# Patient Record
Sex: MALE | Race: AMERICAN INDIAN OR ALASKA NATIVE | Employment: UNEMPLOYED | URBAN - METROPOLITAN AREA
[De-identification: names, ages, dates, MRNs, and addresses within clinical notes are randomized per-mention and may not be internally consistent; named-entity substitution may affect disease eponyms.]

---

## 2021-08-30 ENCOUNTER — APPOINTMENT (OUTPATIENT)
Dept: GENERAL RADIOLOGY | Facility: CLINIC | Age: 36
End: 2021-08-30
Attending: EMERGENCY MEDICINE
Payer: COMMERCIAL

## 2021-08-30 ENCOUNTER — HOSPITAL ENCOUNTER (EMERGENCY)
Facility: CLINIC | Age: 36
Discharge: HOME | End: 2021-08-31
Attending: EMERGENCY MEDICINE | Admitting: EMERGENCY MEDICINE
Payer: COMMERCIAL

## 2021-08-30 DIAGNOSIS — S50.01XA CONTUSION OF RIGHT ELBOW, INITIAL ENCOUNTER: ICD-10-CM

## 2021-08-30 DIAGNOSIS — S20.211A CONTUSION OF RIGHT CHEST WALL, INITIAL ENCOUNTER: ICD-10-CM

## 2021-08-30 PROCEDURE — 250N000013 HC RX MED GY IP 250 OP 250 PS 637: Performed by: EMERGENCY MEDICINE

## 2021-08-30 PROCEDURE — 99283 EMERGENCY DEPT VISIT LOW MDM: CPT | Mod: 25 | Performed by: EMERGENCY MEDICINE

## 2021-08-30 PROCEDURE — 99284 EMERGENCY DEPT VISIT MOD MDM: CPT | Performed by: EMERGENCY MEDICINE

## 2021-08-30 PROCEDURE — 71046 X-RAY EXAM CHEST 2 VIEWS: CPT

## 2021-08-30 PROCEDURE — 73080 X-RAY EXAM OF ELBOW: CPT | Mod: RT

## 2021-08-30 RX ORDER — IBUPROFEN 800 MG/1
800 TABLET, FILM COATED ORAL ONCE
Status: COMPLETED | OUTPATIENT
Start: 2021-08-30 | End: 2021-08-30

## 2021-08-30 RX ADMIN — IBUPROFEN 800 MG: 800 TABLET, FILM COATED ORAL at 23:11

## 2021-08-30 ASSESSMENT — ENCOUNTER SYMPTOMS
FEVER: 0
VOMITING: 0
DIFFICULTY URINATING: 0
HEADACHES: 0
SLEEP DISTURBANCE: 0
SHORTNESS OF BREATH: 0
EYE REDNESS: 0
ABDOMINAL PAIN: 0
NUMBNESS: 0
SORE THROAT: 0
COUGH: 0
NECK PAIN: 0
BACK PAIN: 0
NAUSEA: 0
WEAKNESS: 0
DYSPHORIC MOOD: 0

## 2021-08-30 ASSESSMENT — MIFFLIN-ST. JEOR: SCORE: 2001.74

## 2021-08-31 VITALS
BODY MASS INDEX: 31.02 KG/M2 | SYSTOLIC BLOOD PRESSURE: 143 MMHG | HEART RATE: 116 BPM | HEIGHT: 72 IN | RESPIRATION RATE: 16 BRPM | TEMPERATURE: 98.9 F | DIASTOLIC BLOOD PRESSURE: 89 MMHG | WEIGHT: 229 LBS
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
## 2022-11-11 ENCOUNTER — HOSPITAL ENCOUNTER (EMERGENCY)
Facility: CLINIC | Age: 37
End: 2022-11-11
Payer: COMMERCIAL

## 2025-03-30 ENCOUNTER — HOSPITAL ENCOUNTER (EMERGENCY)
Facility: CLINIC | Age: 40
Discharge: HOME | End: 2025-03-30
Attending: EMERGENCY MEDICINE
Payer: COMMERCIAL

## 2025-03-30 VITALS
BODY MASS INDEX: 24.38 KG/M2 | OXYGEN SATURATION: 100 % | HEART RATE: 82 BPM | WEIGHT: 180 LBS | TEMPERATURE: 97.9 F | HEIGHT: 72 IN | DIASTOLIC BLOOD PRESSURE: 97 MMHG | RESPIRATION RATE: 16 BRPM | SYSTOLIC BLOOD PRESSURE: 165 MMHG

## 2025-03-30 DIAGNOSIS — Z76.0 ENCOUNTER FOR MEDICATION REFILL: ICD-10-CM

## 2025-03-30 PROCEDURE — 250N000013 HC RX MED GY IP 250 OP 250 PS 637: Performed by: EMERGENCY MEDICINE

## 2025-03-30 PROCEDURE — G2213 INITIAT MED ASSIST TX IN ER: HCPCS | Performed by: EMERGENCY MEDICINE

## 2025-03-30 PROCEDURE — 99283 EMERGENCY DEPT VISIT LOW MDM: CPT | Performed by: EMERGENCY MEDICINE

## 2025-03-30 RX ORDER — METHADONE HYDROCHLORIDE 10 MG/ML
150 CONCENTRATE ORAL DAILY
COMMUNITY

## 2025-03-30 RX ORDER — METHADONE HYDROCHLORIDE 10 MG/ML
150 CONCENTRATE ORAL ONCE
Status: COMPLETED | OUTPATIENT
Start: 2025-03-30 | End: 2025-03-30

## 2025-03-30 RX ADMIN — METHADONE HYDROCHLORIDE 150 MG: 10 CONCENTRATE ORAL at 16:53

## 2025-03-30 ASSESSMENT — COLUMBIA-SUICIDE SEVERITY RATING SCALE - C-SSRS
6. HAVE YOU EVER DONE ANYTHING, STARTED TO DO ANYTHING, OR PREPARED TO DO ANYTHING TO END YOUR LIFE?: NO
1. IN THE PAST MONTH, HAVE YOU WISHED YOU WERE DEAD OR WISHED YOU COULD GO TO SLEEP AND NOT WAKE UP?: NO
2. HAVE YOU ACTUALLY HAD ANY THOUGHTS OF KILLING YOURSELF IN THE PAST MONTH?: NO

## 2025-03-30 ASSESSMENT — ACTIVITIES OF DAILY LIVING (ADL)
ADLS_ACUITY_SCORE: 41
ADLS_ACUITY_SCORE: 41

## 2025-03-30 NOTE — PHARMACY
Opioid Treatment Program (Methadone Clinic) Information Note      Treatment program name: [facility]   Location (city): [place]   Phone number: [phone]              Spoke with: Tay LOVELL      Patient's current methadone dose verified as: 150 mg PO daily   Last dose was administered on: 3/28/25   Take-home dose information (if applicable): none      Note(s): Treatment programs in [place] dispense methadone using the 10 mg/ml oral concentrate.      Kathleen Gordon, PharmD, BCEMP, BCCCP, BCPS  03/30/25 4:21 PM

## 2025-03-30 NOTE — ED PROVIDER NOTES
[place] EMERGENCY DEPARTMENT ([facility])    3/30/25       ED PROVIDER NOTE     History     Chief Complaint   Patient presents with    Medication Refill     The history is provided by the patient and medical records.     Augustine Cosme is a 39 year old male with a history of opioid use disorder, alcohol use disorder, TBI, who presents to the ED for a medication refill. The patient states that he missed his methadone clinic appointment x2 days and would like methadone here.  Denies any acute medical or psychiatric concerns at this time.     Past Medical History  Past Medical History:   Diagnosis Date    Substance abuse (H)      Past Surgical History:   Procedure Laterality Date    GENITOURINARY SURGERY       methadone (DOLOPHINE-INTENSOL) 10 MG/ML (HIGH CONC) solution  buprenorphine HCl-naloxone HCl (SUBOXONE) 2-0.5 MG film  hydrOXYzine (ATARAX) 25 MG tablet  naloxone (NARCAN) 1 mg/mL for intranasal kit (2 syringes with 2 mucosal atomizer device)  traZODone (DESYREL) 50 MG tablet      No Known Allergies  Family History  No family history on file.  Social History   Social History     Tobacco Use    Smoking status: Every Day     Current packs/day: 0.50     Types: Cigarettes   Substance Use Topics    Alcohol use: No    Drug use: Yes     Types: IV     Comment: heroine, last use 06/15 @ 23:00, uses every day      A medically appropriate review of systems was performed with pertinent positives and negatives noted in the HPI, and all other systems negative.    Physical Exam   BP: (!) 165/97  Pulse: 82  Temp: 97.9  F (36.6  C)  Resp: 16  Height: 182.9 cm (6')  Weight: 81.6 kg (180 lb)  SpO2: 100 %  Physical Exam  Vitals and nursing note reviewed.   Constitutional:       General: He is not in acute distress.     Appearance: Normal appearance. He is not toxic-appearing.   HENT:      Head: Atraumatic.   Eyes:      General: No scleral icterus.     Conjunctiva/sclera: Conjunctivae normal.   Cardiovascular:       Rate and Rhythm: Normal rate.      Heart sounds: Normal heart sounds.   Pulmonary:      Effort: Pulmonary effort is normal. No respiratory distress.      Breath sounds: Normal breath sounds.   Abdominal:      Palpations: Abdomen is soft.      Tenderness: There is no abdominal tenderness.   Musculoskeletal:         General: No deformity.      Cervical back: Neck supple.   Skin:     General: Skin is warm.   Neurological:      General: No focal deficit present.      Mental Status: He is alert and oriented to person, place, and time.   Psychiatric:         Mood and Affect: Mood normal.         Behavior: Behavior normal.           ED Course, Procedures, & Data      Procedures                No results found for any visits on 03/30/25.  Medications   methadone (DOLOPHINE-INTENSOL) 10 MG/ML (HIGH CONC) solution 150 mg (150 mg Oral $Given 3/30/25 7855)     Labs Ordered and Resulted from Time of ED Arrival to Time of ED Departure - No data to display  No orders to display          Critical care was not performed.     Medical Decision Making  The patient's presentation was of low complexity (a stable chronic illness).    The patient's evaluation involved:  review of external note(s) from 1 sources (methadone clinic)    The patient's management necessitated only low risk treatment.    Assessment & Plan    Augustine Cosme is a 39 year old male with a history of opioid use disorder, alcohol use disorder, TBI, who presents to the ED for a medication refill. The patient states that he missed his methadone clinic appointment x2 days and would like methadone here.  Patient is nontoxic appearing on exam, does not appear to be in acute withdrawal. We confirmed patient's dose with methadone clinic and administered it here. Patient discharged with outpatient follow up and return precautions.     I have reviewed the nursing notes. I have reviewed the findings, diagnosis, plan and need for follow up with the  patient.    Discharge Medication List as of 3/30/2025  4:57 PM          Final diagnoses:   Encounter for medication refill     Kunal Boo MD    formerly [facility] EMERGENCY DEPARTMENT  3/30/2025     Kunal Boo MD  04/02/25 1514

## 2025-03-30 NOTE — ED TRIAGE NOTES
PT reports he missed his methadone clinic apt x 2 days. PT is wanting methadone.      Triage Assessment (Adult)       Row Name 03/30/25 1428          Triage Assessment    Airway WDL WDL        Respiratory WDL    Respiratory WDL WDL        Cardiac WDL    Cardiac WDL WDL

## 2025-07-24 ENCOUNTER — HOSPITAL ENCOUNTER (EMERGENCY)
Facility: CLINIC | Age: 40
Discharge: HOME | End: 2025-07-24
Attending: EMERGENCY MEDICINE | Admitting: EMERGENCY MEDICINE
Payer: COMMERCIAL

## 2025-07-24 VITALS
HEIGHT: 72 IN | OXYGEN SATURATION: 99 % | RESPIRATION RATE: 18 BRPM | DIASTOLIC BLOOD PRESSURE: 81 MMHG | BODY MASS INDEX: 24.38 KG/M2 | WEIGHT: 180 LBS | HEART RATE: 94 BPM | TEMPERATURE: 97.9 F | SYSTOLIC BLOOD PRESSURE: 121 MMHG

## 2025-07-24 DIAGNOSIS — Z79.899 PRESCRIPTION MEDICATION STARTED: Primary | ICD-10-CM

## 2025-07-24 PROCEDURE — 99283 EMERGENCY DEPT VISIT LOW MDM: CPT | Performed by: EMERGENCY MEDICINE

## 2025-07-24 PROCEDURE — 250N000013 HC RX MED GY IP 250 OP 250 PS 637: Performed by: EMERGENCY MEDICINE

## 2025-07-24 RX ORDER — METHADONE HYDROCHLORIDE 10 MG/ML
150 CONCENTRATE ORAL ONCE
Status: COMPLETED | OUTPATIENT
Start: 2025-07-24 | End: 2025-07-24

## 2025-07-24 RX ADMIN — METHADONE HYDROCHLORIDE 150 MG: 10 CONCENTRATE ORAL at 20:16

## 2025-07-24 ASSESSMENT — COLUMBIA-SUICIDE SEVERITY RATING SCALE - C-SSRS
6. HAVE YOU EVER DONE ANYTHING, STARTED TO DO ANYTHING, OR PREPARED TO DO ANYTHING TO END YOUR LIFE?: NO
2. HAVE YOU ACTUALLY HAD ANY THOUGHTS OF KILLING YOURSELF IN THE PAST MONTH?: NO
1. IN THE PAST MONTH, HAVE YOU WISHED YOU WERE DEAD OR WISHED YOU COULD GO TO SLEEP AND NOT WAKE UP?: NO

## 2025-07-24 ASSESSMENT — ACTIVITIES OF DAILY LIVING (ADL): ADLS_ACUITY_SCORE: 41

## 2025-07-25 NOTE — ED PROVIDER NOTES
ED PROVIDER NOTE  July 24, 2025  History     Chief Complaint   Patient presents with    methadone maintenance treatment     HPI  Augustine Jadon Cosme is a 40 year old male who has a history significant opiate use disorder, alcohol use disorder, TBI.  He arrives today to the emergency department after missed dose of his methadone.  The patient reports he was unable to get to his appointment.  His typical dose is 150 mg.  He reports no withdrawal symptoms.  He reports no further use.  He reports no current medical concerns.      Past Medical History  Past Medical History:   Diagnosis Date    Substance abuse (H)      Past Surgical History:   Procedure Laterality Date    GENITOURINARY SURGERY       buprenorphine HCl-naloxone HCl (SUBOXONE) 2-0.5 MG film  hydrOXYzine (ATARAX) 25 MG tablet  methadone (DOLOPHINE-INTENSOL) 10 MG/ML (HIGH CONC) solution  naloxone (NARCAN) 1 mg/mL for intranasal kit (2 syringes with 2 mucosal atomizer device)  traZODone (DESYREL) 50 MG tablet      No Known Allergies  Family History  No family history on file.  Social History   Social History     Tobacco Use    Smoking status: Every Day     Current packs/day: 0.50     Types: Cigarettes   Substance Use Topics    Alcohol use: No    Drug use: Yes     Types: IV     Comment: heroine, last use 06/15 @ 23:00, uses every day         A medically appropriate review of systems was performed with pertinent positives and negatives noted in the HPI, and all other systems negative.      Physical Exam   BP: 121/81  Pulse: 94  Temp: 97.9  F (36.6  C)  Resp: 18  Height: 182.9 cm (6')  Weight: 81.6 kg (180 lb)  SpO2: 99 %      Physical Exam  Vitals and nursing note reviewed.   Constitutional:       General: He is not in acute distress.     Appearance: He is not ill-appearing or diaphoretic.   HENT:      Head: Normocephalic and atraumatic.   Cardiovascular:      Rate and Rhythm: Normal rate and regular rhythm.   Pulmonary:      Effort: Pulmonary effort is  normal. No respiratory distress.   Skin:     Findings: No rash.   Neurological:      General: No focal deficit present.      Mental Status: He is alert and oriented to person, place, and time.      Cranial Nerves: No cranial nerve deficit.      Motor: No weakness.   Psychiatric:         Mood and Affect: Mood normal.         Behavior: Behavior normal.         Thought Content: Thought content normal.         ED Course        Procedures         No results found for this or any previous visit (from the past 24 hours).  Medications   methadone (DOLOPHINE-INTENSOL) 10 MG/ML (HIGH CONC) solution 150 mg (150 mg Oral $Given 7/24/25 2016)             Critical care was not performed.     Medical Decision Making  The patient's presentation was of straightforward complexity (a clearly self-limited or minor problem).    The patient's evaluation involved:  history and exam without other MDM data elements    The patient's management necessitated only low risk treatment.    Assessments & Plan (with Medical Decision Making)     Augustine Cosme is a 40 year old male who has a history significant opiate use disorder, alcohol use disorder, TBI.  He arrives today to the emergency department after missed dose of his methadone.  Upon arrival patient to be alert patient.  He is presently afebrile and hemodynamically stable.  He seated upright in a chair on my examination.  Is no visible signs of external trauma.  No sign of acute intoxication or withdrawal type symptoms.  No other specific toxidrome.  I have a chance to review his records and confirm dosing with the patient.  He was administered medication in the emergency department.  He will continue to follow as an outpatient.  We are certainly happy to reevaluate this patient emergently should have change, progression or worsening symptoms.    I have reviewed the nursing notes.    I have reviewed the findings, diagnosis, plan and need for follow up with the patient.    New  Prescriptions    No medications on file       Final diagnoses:   Prescription medication started       MICHAEL ARRIAGA MD    7/24/2025   [facility] EMERGENCY DEPARTMENT     Michael Arriaga MD  07/24/25 2054

## 2025-07-25 NOTE — ED TRIAGE NOTES
Pt ambulatory to triage for a dose of methadone. Pt has hx of opioid abuse and is on methadone treatment program for 5 years. Pt missed to go the clinic today.     Triage Assessment (Adult)       Row Name 07/24/25 4164          Triage Assessment    Airway WDL WDL        Respiratory WDL    Respiratory WDL WDL        Skin Circulation/Temperature WDL    Skin Circulation/Temperature WDL WDL        Cardiac WDL    Cardiac WDL WDL        Peripheral/Neurovascular WDL    Peripheral Neurovascular WDL WDL        Cognitive/Neuro/Behavioral WDL    Cognitive/Neuro/Behavioral WDL WDL